# Patient Record
Sex: MALE | Race: WHITE | NOT HISPANIC OR LATINO | Employment: FULL TIME | URBAN - METROPOLITAN AREA
[De-identification: names, ages, dates, MRNs, and addresses within clinical notes are randomized per-mention and may not be internally consistent; named-entity substitution may affect disease eponyms.]

---

## 2022-05-06 ENCOUNTER — VIRTUAL VISIT (OUTPATIENT)
Dept: FAMILY MEDICINE | Facility: CLINIC | Age: 37
End: 2022-05-06

## 2022-05-06 ENCOUNTER — TELEPHONE (OUTPATIENT)
Dept: FAMILY MEDICINE | Facility: CLINIC | Age: 37
End: 2022-05-06

## 2022-05-06 DIAGNOSIS — S02.609A CLOSED FRACTURE OF JAW, INITIAL ENCOUNTER (H): ICD-10-CM
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
## 2022-05-06 DIAGNOSIS — U07.1 INFECTION DUE TO 2019 NOVEL CORONAVIRUS: ICD-10-CM

## 2022-05-06 DIAGNOSIS — S09.93XA INJURY OF TOOTH, INITIAL ENCOUNTER: ICD-10-CM

## 2022-05-06 DIAGNOSIS — Z76.89 ESTABLISHING CARE WITH NEW DOCTOR, ENCOUNTER FOR: ICD-10-CM

## 2022-05-06 PROCEDURE — 99204 OFFICE O/P NEW MOD 45 MIN: CPT | Mod: 95 | Performed by: NURSE PRACTITIONER

## 2022-05-06 RX ORDER — OXYCODONE HYDROCHLORIDE 10 MG/1
10 TABLET ORAL EVERY 6 HOURS PRN
Qty: 60 TABLET | Refills: 0 | Status: CANCELLED | OUTPATIENT
Start: 2022-05-06

## 2022-05-06 RX ORDER — OXYCODONE HYDROCHLORIDE 10 MG/1
10 TABLET ORAL EVERY 6 HOURS PRN
Qty: 60 TABLET | Refills: 0 | Status: SHIPPED | OUTPATIENT
Start: 2022-05-06 | End: 2022-05-06 | Stop reason: ALTCHOICE

## 2022-05-06 RX ORDER — OXYCODONE HYDROCHLORIDE 10 MG/1
TABLET ORAL
COMMUNITY
Start: 2022-05-02 | End: 2022-05-06

## 2022-05-06 NOTE — TELEPHONE ENCOUNTER
POD/KATHY,    Patient called stated his pharmacy does not have any of the oxy in stock. I called [facility] they said they do have in stock. Can med please be sent to [facility]?    Requested Prescriptions   Pending Prescriptions Disp Refills     oxyCODONE IR (ROXICODONE) 10 MG tablet 60 tablet 0     Sig: Take 1 tablet (10 mg) by mouth every 6 hours as needed for severe pain       There is no refill protocol information for this order            Please notify patient by [employer] (Inspect Manager)hart message when medication sent to pharmacy.     Thanks,  ISSA Oviedo  [facility]

## 2022-05-06 NOTE — TELEPHONE ENCOUNTER
Med sent to [facility] pharmacy as requested.     Thanks,  ISSA Oviedo  [facility]

## 2022-05-06 NOTE — LETTER
[facility]  [address] [place] [address] [address]  [address]  [facility] [address]  Phone: [phone]  Fax: [phone]    May 6, 2022        Jose Grmim  [address] [facility] [address]          To whom it may concern:    RE: Jose Grimm    Patient was seen for a work injury he sustained while working in [place] on 5/2/2022.     He is not able to work due to this injury, and will see the specialist on 5/17/2022 to determine an updated plan at that time.     Please contact me for questions or concerns.      Sincerely,        YANIRA Reeves CNP

## 2022-05-06 NOTE — PROGRESS NOTES
Jose is a 37 year old who is being evaluated via a billable video visit.      How would you like to obtain your AVS? MyChart  If the video visit is dropped, the invitation should be resent by:   Will anyone else be joining your video visit?     Video Start Time:   Start: 05/06/2022 10:14 am  Stop: 05/06/2022 10:34 am    Assessment & Plan       ICD-10-CM    1. Work related injury  Y99.0    2. Closed fracture of jaw, initial encounter (H)  S02.609A DISCONTINUED: magic mouthwash suspension (diphenhydrAMINE, lidocaine, aluminum-magnesium & simethicone)     DISCONTINUED: oxyCODONE IR (ROXICODONE) 10 MG tablet   3. Injury of tooth, initial encounter  S09.93XA DISCONTINUED: magic mouthwash suspension (diphenhydrAMINE, lidocaine, aluminum-magnesium & simethicone)     DISCONTINUED: oxyCODONE IR (ROXICODONE) 10 MG tablet   4. Establishing care with new doctor, encounter for  Z76.89    5. Infection due to 2019 novel coronavirus  U07.1     will do short term opioid refill to last until he can follow up with Oralfacial surgeon and determine plan for pain control, pt was told no NSAIDs since severe reaction to toradol  Continue all other cares, letter given for work to have off until 5/17/22 when he can get his leave set with his surgeon. Notify their office if any questions or concerns, per pt records from [place] are being sent and no records here yet  Needs a pre-op, will schedule     covid cares discussed, when to go to ER, has wife to  his meds for him. Fevers but stable currently and appears well hydrated, is quarantine appropriately      Return in about 1 week (around 5/13/2022) for pre op exam with lab work .    After this visit, pt requested change of pharmacy and found out more info-- pt had 2 previous Rx for Oxy in March, cancelled this Rx and no further refills, needs to see surgeon or if pain control needed can go to ER    YANIRA Reeves CNP  [facility]    Subjective   Jose is a  "37 year old who presents for the following health issues     History of Present Illness       Reason for visit:  Referral  Symptom onset:  1-3 days ago  Symptoms include:  Jaw pain  Symptom intensity:  Severe  Symptom progression:  Staying the same  Had these symptoms before:  No  What makes it worse:  Talking and taking in cold air  What makes it better:  Sleeping and taking meds on time   He is taking medications regularly.   Workers comp-pt had injury 5/2/22 while at his worksite in [place]- a  snapped while he was on the platform and his jaw was fractured and teeth broken  he also tested positive for COVID    5/2 was in hosptial in [place] overnight, no records for me to view, pt said they did some CTs to check his head and worked the jaw, told him to follow up when back in [place] and get a Primary Care Provider     Also has COVID fever right around 101-102 waxing and waning, no issues breathing just bit of sore throat and body aches, sniffles and few coughing spells feels a bit better today     \"Needs a referral for his job\" saying he will have surgery   [facility] 5/17/22  May have surgery that day, he needs a pre-op    Magic mouthwash doing four times a day and prn   Ensure giving him diarrhea, but good uo and feels hydrated drinking well     Took the oxycodone out of the snap pack in the hosptial so has in a small bottle   20 mg oxy every 6 hrs is what he is needing/taking for the pain, front teeth that were knocked out is the most painful, has wired jaw and bleeding manageable, jaw bra with ice in place    Reports his throat swelled from toradol so they told him to avoid NSAIDs  They told him his due to his wt he may need higher dose for pain control  Denies any previous pain med use or drug use     Review of Systems   Constitutional, HEENT, cardiovascular, pulmonary, GI, , musculoskeletal, neuro, skin, endocrine and psych systems are negative, except as otherwise noted.    "   Objective           Vitals:  No vitals were obtained today due to virtual visit.    Physical Exam   GENERAL: mildly ill, alert and no distress, teeth and jaw injuries noted, mouth wired closed and ice in jaw bra in place, old blood noted   EYES: Eyes grossly normal to inspection.  No discharge or erythema, or obvious scleral/conjunctival abnormalities.  NECK: No asymmetry, visible masses or scars  RESP: No audible wheeze, cough, or visible cyanosis.  Mild rhinitis noted, No visible retractions or increased work of breathing.    SKIN: Visible skin clear. No significant rash, abnormal pigmentation or lesions.  NEURO: Cranial nerves grossly intact.  Mentation and speech appropriate for age.  PSYCH: Mentation appears normal, affect normal/bright, judgement and insight intact, normal speech and appearance well-groomed.                Video-Visit Details    Type of service:  Video Visit    Video End Time:1034    Originating Location (pt. Location): Home    Distant Location (provider location):  [facility]     Platform used for Video Visit: [employer]

## 2022-05-06 NOTE — TELEPHONE ENCOUNTER
Medication declined   he , per the [facility]  2 PRESCRIPTION oxycodone in March  Per Paige's note he did not say he had been on it  Until we get records I would not recommend refilling it  As it is a WORKMANS COMP injury can go to ER if needed

## 2022-05-09 NOTE — TELEPHONE ENCOUNTER
Noted,agree with plan to NOT fill this script. In the Epic  did not show any Rx other than the previous from the hospital, thanks team for catching that.   Closing this and no further Rx for pt

## 2022-05-29 ENCOUNTER — HEALTH MAINTENANCE LETTER (OUTPATIENT)
Age: 37
End: 2022-05-29

## 2022-10-03 ENCOUNTER — HEALTH MAINTENANCE LETTER (OUTPATIENT)
Age: 37
End: 2022-10-03

## 2023-06-04 ENCOUNTER — HEALTH MAINTENANCE LETTER (OUTPATIENT)
Age: 38
End: 2023-06-04

## 2024-07-28 ENCOUNTER — HEALTH MAINTENANCE LETTER (OUTPATIENT)
Age: 39
End: 2024-07-28